# Patient Record
Sex: FEMALE | Race: WHITE | Employment: FULL TIME | URBAN - NONMETROPOLITAN AREA
[De-identification: names, ages, dates, MRNs, and addresses within clinical notes are randomized per-mention and may not be internally consistent; named-entity substitution may affect disease eponyms.]

---

## 2021-09-22 ENCOUNTER — OFFICE VISIT (OUTPATIENT)
Dept: OBGYN CLINIC | Age: 41
End: 2021-09-22
Payer: COMMERCIAL

## 2021-09-22 VITALS
WEIGHT: 198 LBS | DIASTOLIC BLOOD PRESSURE: 85 MMHG | SYSTOLIC BLOOD PRESSURE: 135 MMHG | HEIGHT: 66 IN | BODY MASS INDEX: 31.82 KG/M2

## 2021-09-22 DIAGNOSIS — Z01.419 WOMEN'S ANNUAL ROUTINE GYNECOLOGICAL EXAMINATION: Primary | ICD-10-CM

## 2021-09-22 DIAGNOSIS — Z12.31 ENCOUNTER FOR SCREENING MAMMOGRAM FOR MALIGNANT NEOPLASM OF BREAST: ICD-10-CM

## 2021-09-22 PROCEDURE — 99396 PREV VISIT EST AGE 40-64: CPT | Performed by: NURSE PRACTITIONER

## 2021-09-22 RX ORDER — LISINOPRIL 10 MG/1
TABLET ORAL
COMMUNITY
Start: 2021-06-21 | End: 2021-09-22 | Stop reason: SINTOL

## 2021-09-22 RX ORDER — LOSARTAN POTASSIUM 25 MG/1
TABLET ORAL
COMMUNITY
Start: 2021-08-05 | End: 2021-09-22 | Stop reason: SINTOL

## 2021-09-22 NOTE — PROGRESS NOTES
YEARLY PHYSICAL    Date of service: 2021    Cletis Phoenix  Is a 37 y.o.   female    PT's PCP is: No primary care provider on file. : 1980                                             Subjective:       Patient's last menstrual period was 2021. Are your menses regular: yes    OB History    Para Term  AB Living   4 4 4     4   SAB TAB Ectopic Molar Multiple Live Births             4      # Outcome Date GA Lbr Merlin/2nd Weight Sex Delivery Anes PTL Lv   4 Term      Vag-Spont   BETSY   3 Term      Vag-Spont   BETSY   2 Term      Vag-Spont   BETSY   1 Term      Vag-Spont   BETSY        Social History     Tobacco Use   Smoking Status Never Smoker   Smokeless Tobacco Never Used        Social History     Substance and Sexual Activity   Alcohol Use Not Currently       Family History   Problem Relation Age of Onset    Hypertension Mother        Any family history of breast or ovarian cancer: No    Any family history of blood clots: No    Allergies: Patient has no known allergies. No current outpatient medications on file. Social History     Substance and Sexual Activity   Sexual Activity Yes    Partners: Male    Birth control/protection: Surgical    Comment: Tubal       Any bleeding or pain with intercourse: No    Last Yearly:  2020    Last pap: 2019    Last HPV: 2019    Last Mammogram: due     Last Dexascan n/a    Last colorectal screen-n/a    Do you do self breast exams: Yes    History reviewed. No pertinent past medical history. Past Surgical History:   Procedure Laterality Date    CARPAL TUNNEL RELEASE Right 2021    HEMORRHOID SURGERY      TUBAL LIGATION         Family History   Problem Relation Age of Onset    Hypertension Mother        Chief Complaint   Patient presents with    Gynecologic Exam     Last pap 2019.            PE:  Vital Signs  Blood pressure 135/85, height 5' 6\" (1.676 m), weight 198 lb (89.8 kg), last menstrual period 09/16/2021. Estimated body mass index is 31.96 kg/m² as calculated from the following:    Height as of this encounter: 5' 6\" (1.676 m). Weight as of this encounter: 198 lb (89.8 kg). HPI: Patient presents today for annual exam. Feeling well, voices no concerns. Denies breast/pelvic pain. Negative pap 7/2019. Mammogram due. Reports monthly menses. Review of Systems   All other systems reviewed and are negative. Objective  Heent:   negative   Cor: regular rate and rhythm, no murmurs              Pul:clear to auscultation bilaterally- no wheezes, rales or rhonchi, normal air movement, no respiratory distress      GI: Abdomen soft, non-tender. BS normal. No masses,  No organomegaly           Extremities: normal strength, tone, and muscle mass, ROM of all joints is normal   Breasts: Breast:normal appearance, no masses or tenderness, No nipple retraction or dimpling, No nipple discharge or bleeding   Pelvic Exam: GENITAL/URINARY:  External Genitalia:  General appearance; normal, Hair distribution; normal, Lesions absent  Urethral Meatus:  Size normal, Location normal, Lesions absent, Prolapse absent  Urethra: Fullness absent, Masses absent  Bladder:  Fullness absent, Masses absent, Tenderness absent, Cystocele absent  Vagina:  General appearance normal, Estrogen effect normal, Discharge absent, Lesions absent, Pelvic support normal  Cervix:  General appearance normal, Lesions absent, Discharge absent, Tenderness absent, Enlargement absent, Nodularity absent  Uterus:  Size normal, Tenderness absent  [place]: Masses absent, Tenderness absent  Anus/Perineum:  Lesions absent and Masses absent                                    Vaginal discharge: no vaginal discharge   Chaperone: not present                       Assessment and Plan          Diagnosis Orders   1. Women's annual routine gynecological examination     2.  Encounter for screening mammogram for malignant neoplasm of breast  LESTER DIGITAL SCREEN W OR WO CAD BILATERAL             I have discontinued Melania Alcaraz's losartan and lisinopril. Return in about 1 year (around 9/22/2022) for yearly. She was also counseled on her preventative health maintenance recommendations and follow-up. There are no Patient Instructions on file for this visit.     MADDY Blackwood - NP,9/22/2021 11:42 AM

## 2022-03-02 DIAGNOSIS — Z12.31 ENCOUNTER FOR SCREENING MAMMOGRAM FOR MALIGNANT NEOPLASM OF BREAST: ICD-10-CM

## 2022-06-28 ENCOUNTER — OFFICE VISIT (OUTPATIENT)
Dept: OBGYN CLINIC | Age: 42
End: 2022-06-28
Payer: COMMERCIAL

## 2022-06-28 VITALS — DIASTOLIC BLOOD PRESSURE: 74 MMHG | BODY MASS INDEX: 31.8 KG/M2 | WEIGHT: 197 LBS | SYSTOLIC BLOOD PRESSURE: 120 MMHG

## 2022-06-28 DIAGNOSIS — Z91.89 AT INCREASED RISK OF BREAST CANCER: Primary | ICD-10-CM

## 2022-06-28 PROCEDURE — 99213 OFFICE O/P EST LOW 20 MIN: CPT

## 2022-06-28 RX ORDER — LOSARTAN POTASSIUM 25 MG/1
TABLET ORAL
COMMUNITY
Start: 2022-06-11

## 2022-06-28 NOTE — PROGRESS NOTES
DATE OF VISIT:  6/28/22    PATIENT NAME:  Arianna Cohen     YOB: 1980    REASON FOR VISIT:    Chief Complaint   Patient presents with    Breast Pain     Noticing bilateral breast tenderness, has been intermittently over the last 6 months, on 6/25 had sharp pain on left breast, on 6/26 it was her right breast, denies any redness/irritation, discharge        HISTORY OF PRESENT ILLNESS:  Patient presents with bilateral breast tenderness. Has been intermittent for last 6 months but had sharp pains over weekend. No overlying skin changes, has not noticed any abnormalities on self breast exam.  She had mammogram in February that showed that she is candidate for screening MRI due to 20.1% chance of breast CA. Agreeable to scheduling. Reassured on exam that there are no grossly abnormal findings but patient does have fibrocystic tissue that may be contributing to tenderness. She also can't rule out strenuous activity that could have caused chest muscle strain contributing to symptoms. Aware that she is due for annual in September. No LMP recorded. Vitals:    06/28/22 1122   BP: 120/74   Site: Right Upper Arm   Weight: 197 lb (89.4 kg)     Body mass index is 31.8 kg/m². No Known Allergies  Current Outpatient Medications   Medication Sig Dispense Refill    losartan (COZAAR) 25 MG tablet take 1 tablet by mouth once daily       No current facility-administered medications for this visit.      Social History     Socioeconomic History    Marital status:      Spouse name: Not on file    Number of children: Not on file    Years of education: Not on file    Highest education level: Not on file   Occupational History    Not on file   Tobacco Use    Smoking status: Never Smoker    Smokeless tobacco: Never Used   Vaping Use    Vaping Use: Never used   Substance and Sexual Activity    Alcohol use: Not Currently    Drug use: Never    Sexual activity: Yes     Partners: Male     Birth control/protection: Surgical     Comment: Tubal   Other Topics Concern    Not on file   Social History Narrative    Not on file     Social Determinants of Health     Financial Resource Strain:     Difficulty of Paying Living Expenses: Not on file   Food Insecurity:     Worried About Running Out of Food in the Last Year: Not on file    Darius of Food in the Last Year: Not on file   Transportation Needs:     Lack of Transportation (Medical): Not on file    Lack of Transportation (Non-Medical): Not on file   Physical Activity:     Days of Exercise per Week: Not on file    Minutes of Exercise per Session: Not on file   Stress:     Feeling of Stress : Not on file   Social Connections:     Frequency of Communication with Friends and Family: Not on file    Frequency of Social Gatherings with Friends and Family: Not on file    Attends Orthodoxy Services: Not on file    Active Member of [address] [place] [address] [employer] or Organizations: Not on file    Attends Club or Organization Meetings: Not on file    Marital Status: Not on file   Intimate Partner Violence:     Fear of Current or Ex-Partner: Not on file    Emotionally Abused: Not on file    Physically Abused: Not on file    Sexually Abused: Not on file   Housing Stability:     Unable to Pay for Housing in the Last Year: Not on file    Number of [place] in the Last Year: Not on file    Unstable Housing in the Last Year: Not on file       REVIEW OF SYSTEMS:  Review of Systems   Constitutional: Negative for chills, fatigue and fever. Genitourinary:        Bilateral breast tenderness       PHYSICAL EXAM:  /74 (Site: Right Upper Arm)   Wt 197 lb (89.4 kg)   BMI 31.80 kg/m²   Physical Exam  Constitutional:       Appearance: Normal appearance. Genitourinary:   Breasts: Breasts are symmetrical.      Right: Tenderness present. No inverted nipple, nipple discharge, skin change or axillary adenopathy. Left: Tenderness present.  No inverted nipple, nipple discharge, skin change or axillary adenopathy. Breast exam comments: Fibrocystic tissue bilaterally. HENT:      Head: Normocephalic and atraumatic. Mouth/Throat:      Mouth: Mucous membranes are moist.   Eyes:      Extraocular Movements: Extraocular movements intact. Musculoskeletal:         General: Normal range of motion. Cervical back: Normal range of motion. Lymphadenopathy:      Upper Body:      Right upper body: No axillary adenopathy. Left upper body: No axillary adenopathy. Neurological:      General: No focal deficit present. Mental Status: She is alert and oriented to person, place, and time. Skin:     General: Skin is warm and dry. Psychiatric:         Mood and Affect: Mood normal.         Behavior: Behavior normal.         Thought Content: Thought content normal.       The patient, Eleanor Peryera is a 40 y.o. female, was seen with a total time spent of 20 minutes for the visit on this date of service by the E/M provider. The time component had both face to face and non face to face time spent in determining the total time component. Counseling and education regarding her diagnosis listed below and her options regarding those diagnoses were also included in determining her time component. The patient had her preventative health maintenance recommendations and follow-up reviewed with her at the completion of her visit. ASSESSMENT:  1.  At increased risk of breast cancer        PLAN:  Orders Placed This Encounter   Procedures    MRI Breast Bilateral W WO Contrast     Return in about 3 months (around 9/28/2022) for annual.       Electronically signed by Isabel aCrrasco PA-C on 06/28/22

## 2022-07-18 ENCOUNTER — TELEPHONE (OUTPATIENT)
Dept: OBGYN CLINIC | Age: 42
End: 2022-07-18

## 2022-07-18 DIAGNOSIS — Z91.89 AT INCREASED RISK OF BREAST CANCER: Primary | ICD-10-CM

## 2022-07-18 DIAGNOSIS — N64.4 BREAST TENDERNESS IN FEMALE: ICD-10-CM

## 2022-07-18 NOTE — TELEPHONE ENCOUNTER
Patient called because she was scheduled for MRI but was unable to complete. Says she had never had an MRI before and got there and got claustrophobic which has never happened before. Wasn't sure if there was somewhere else to try, or they mentioned possibility of a med to help out.

## 2022-07-21 NOTE — TELEPHONE ENCOUNTER
Pt called and stated she would like order for usn sent to [facility]. Was unsure if she needed to do anything else.

## 2022-07-22 DIAGNOSIS — N64.4 BREAST TENDERNESS: Primary | ICD-10-CM

## 2022-07-28 NOTE — TELEPHONE ENCOUNTER
Patient had left a message earlier today that no one from [facility] [place] had called her. There is nothing scanned in that her order had ever been faxed to [facility] [place]. Order faxed and they will call patient to schedule. I did receive a call from Augustine De La Cruz at [address] [place] [address], the radiologist protocol is to get a bilateral Diagnostic Mammogram if it has been six months since her screening and if the breast tenderness is not superficial.  I spoke to patient and reviewed these recommendations. She states that there is not a specific spot on her breast and she was ok with doing a bilateral diagnostic mammogram as long as she was \"not in a tube. \"  Order for bilateral diagnostic mammogram faxed to [address] [place] [address], they will call patient to schedule. Patient verbalized understanding, no further questions/concerns voiced.

## 2024-11-18 ENCOUNTER — TELEPHONE (OUTPATIENT)
Dept: OBGYN CLINIC | Age: 44
End: 2024-11-18

## 2024-12-23 ENCOUNTER — OFFICE VISIT (OUTPATIENT)
Dept: OBGYN CLINIC | Age: 44
End: 2024-12-23
Payer: COMMERCIAL

## 2024-12-23 VITALS
DIASTOLIC BLOOD PRESSURE: 82 MMHG | BODY MASS INDEX: 35.84 KG/M2 | HEIGHT: 66 IN | SYSTOLIC BLOOD PRESSURE: 122 MMHG | WEIGHT: 223 LBS

## 2024-12-23 DIAGNOSIS — Z01.419 WOMEN'S ANNUAL ROUTINE GYNECOLOGICAL EXAMINATION: Primary | ICD-10-CM

## 2024-12-23 PROCEDURE — 99396 PREV VISIT EST AGE 40-64: CPT | Performed by: NURSE PRACTITIONER

## 2024-12-23 RX ORDER — ESCITALOPRAM OXALATE 20 MG/1
20 TABLET ORAL DAILY
COMMUNITY
Start: 2024-09-30

## 2024-12-23 ASSESSMENT — ENCOUNTER SYMPTOMS
ABDOMINAL PAIN: 0
SHORTNESS OF BREATH: 0
DIARRHEA: 0
CONSTIPATION: 0

## 2024-12-23 NOTE — PROGRESS NOTES
YEARLY PHYSICAL    Date of service: 2024    Melania Alcaraz  Is a 44 y.o. female    PT's PCP is: No primary care provider on file.     : 1980                                         Chaperone for Intimate Exam  Chaperone was offered as part of the rooming process. Patient declined and agrees to continue with exam without a chaperone.  Chaperone: n/a      Subjective:       Patient's last menstrual period was 2024.     Are your menses regular: yes    OB History    Para Term  AB Living   4 4 4     4   SAB IAB Ectopic Molar Multiple Live Births             4      # Outcome Date GA Lbr Merlin/2nd Weight Sex Type Anes PTL Lv   4 Term      Vag-Spont   BETSY   3 Term      Vag-Spont   BETSY   2 Term      Vag-Spont   BETSY   1 Term      Vag-Spont   BETSY        Social History     Tobacco Use   Smoking Status Never   Smokeless Tobacco Never        Social History     Substance and Sexual Activity   Alcohol Use Not Currently       Family History   Problem Relation Age of Onset    Hypertension Mother     Breast Cancer Maternal Aunt     Breast Cancer Maternal Cousin        Any family history of breast or ovarian cancer: Yes    Any family history of blood clots: No      Allergies: Patient has no known allergies.      Current Outpatient Medications:     escitalopram (LEXAPRO) 20 MG tablet, Take 1 tablet by mouth daily, Disp: , Rfl:     losartan (COZAAR) 25 MG tablet, take 1 tablet by mouth once daily, Disp: , Rfl:     Social History     Substance and Sexual Activity   Sexual Activity Yes    Partners: Male    Birth control/protection: Surgical    Comment: Tubal       Any bleeding or pain with intercourse: No    Last Yearly:  4/10/23    Last pap: 4/10/23-neg    Last HPV: 4/10/23-neg    Last Mammogram: 2023- scheduled in 2025    Last Dexascan n/a    Last colorectal screen- n/a    Do you do self breast exams: encouraged     Past Medical